# Patient Record
Sex: MALE | Race: ASIAN | NOT HISPANIC OR LATINO
[De-identification: names, ages, dates, MRNs, and addresses within clinical notes are randomized per-mention and may not be internally consistent; named-entity substitution may affect disease eponyms.]

---

## 2019-11-06 ENCOUNTER — APPOINTMENT (OUTPATIENT)
Dept: ORTHOPEDIC SURGERY | Facility: CLINIC | Age: 24
End: 2019-11-06
Payer: COMMERCIAL

## 2019-11-06 DIAGNOSIS — S86.019A STRAIN OF UNSPECIFIED ACHILLES TENDON, INITIAL ENCOUNTER: ICD-10-CM

## 2019-11-06 PROBLEM — Z00.00 ENCOUNTER FOR PREVENTIVE HEALTH EXAMINATION: Status: ACTIVE | Noted: 2019-11-06

## 2019-11-06 PROCEDURE — 73610 X-RAY EXAM OF ANKLE: CPT

## 2019-11-06 PROCEDURE — 99204 OFFICE O/P NEW MOD 45 MIN: CPT

## 2019-11-06 RX ORDER — NABUMETONE 500 MG/1
500 TABLET, FILM COATED ORAL
Qty: 30 | Refills: 2 | Status: ACTIVE | COMMUNITY
Start: 2019-11-06 | End: 1900-01-01

## 2019-11-06 RX ORDER — OXYCODONE AND ACETAMINOPHEN 5; 325 MG/1; MG/1
5-325 TABLET ORAL
Qty: 30 | Refills: 0 | Status: ACTIVE | COMMUNITY
Start: 2019-11-06 | End: 1900-01-01

## 2019-11-06 NOTE — HISTORY OF PRESENT ILLNESS
[de-identified] : Patient reports that the LEFT ankle has been bothering him for 1 day now. He reports he felt a pop in the ankle and fell while playing basketball. He is currently using crutches. He reports discomfort in the ankle, he is unable to walk or put pressure on the ankle. He can flex the ankle but cannot point the toes. Pain with twisting and turning the ankle.

## 2019-11-06 NOTE — ASSESSMENT
[FreeTextEntry1] : Discussed with patient at length symptoms and diagnosis.  Lengthy discussion with patient regarding nonoperative and operative risks and benefits as well as surgical expectations and postop protocols and expectations, including the possibility that surgery may fail to satisfactorily resolve patient's condition / symptoms.  Patient expresses understanding and patient's questions were answered.  Patient elects to proceed with surgery.

## 2019-11-08 ENCOUNTER — APPOINTMENT (OUTPATIENT)
Dept: ORTHOPEDIC SURGERY | Facility: AMBULATORY SURGERY CENTER | Age: 24
End: 2019-11-08

## 2019-11-13 ENCOUNTER — APPOINTMENT (OUTPATIENT)
Dept: ORTHOPEDIC SURGERY | Facility: CLINIC | Age: 24
End: 2019-11-13

## 2024-03-18 PROBLEM — R53.83 FATIGUE: Status: ACTIVE | Noted: 2024-03-18

## 2024-03-18 PROBLEM — Z84.1 FAMILY HISTORY OF KIDNEY DISEASE: Status: ACTIVE | Noted: 2024-03-18

## 2024-03-18 PROBLEM — G47.63 BRUXISM, SLEEP-RELATED: Status: ACTIVE | Noted: 2024-03-18

## 2024-03-18 PROBLEM — Z00.01 ENCOUNTER FOR ROUTINE ADULT MEDICAL EXAM WITH ABNORMAL FINDINGS: Status: ACTIVE | Noted: 2024-03-18

## 2024-04-27 ENCOUNTER — HOSPITAL ENCOUNTER (OUTPATIENT)
Dept: ULTRASOUND IMAGING | Age: 29
Discharge: HOME | End: 2024-04-27
Attending: FAMILY MEDICINE

## 2024-04-27 DIAGNOSIS — Z84.1 FAMILY HISTORY OF KIDNEY DISEASE: ICD-10-CM

## 2024-04-27 DIAGNOSIS — R53.83 FATIGUE, UNSPECIFIED TYPE: ICD-10-CM

## 2024-04-27 PROCEDURE — 76700 US EXAM ABDOM COMPLETE: CPT
